# Patient Record
(demographics unavailable — no encounter records)

---

## 2025-03-20 NOTE — DATA REVIEWED
[MRI] : MRI [Left] : left [Knee] : knee [Report was reviewed and noted in the chart] : The report was reviewed and noted in the chart [I reviewed the films/CD] : I reviewed the films/CD [FreeTextEntry1] : Patellar chondropathy grade II/III.  Dysplastic femoral trochlea.  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  Oblique tear in the body and posterior horn of the medial meniscus associated with a parameniscal cyst.  Incipient semimembranosus tendinopathy.

## 2025-03-20 NOTE — HISTORY OF PRESENT ILLNESS
[de-identified] : Patient denies any injury, he reports loss of ROM that began about 1 year ago. He reports medial knee pain. He has treated with PT and HEP.

## 2025-03-20 NOTE — PHYSICAL EXAM
[Normal Coordination] : normal coordination [Normal Sensation] : normal sensation [Normal Mood and Affect] : normal mood and affect [Oriented] : oriented [Able to Communicate] : able to communicate [Well Developed] : well developed [Well Nourished] : well nourished [AP Standing] : anteroposterior standing [Outside films reviewed] : Outside films reviewed [Left] : left knee [4___] : hamstring 4[unfilled]/5 [Positive] : positive Gonzalo [] : no extensor lag [There are no fractures, subluxations or dislocations. No significant abnormalities are seen] : There are no fractures, subluxations or dislocations. No significant abnormalities are seen [[id number]] : flexion 120 degrees

## 2025-03-20 NOTE — DISCUSSION/SUMMARY
[Surgical risks reviewed] : Surgical risks reviewed [de-identified] : The risks of the procedure, including but not limited to infection, bleeding, anesthetic complication, neurovascular injury and the possibility of subsequent surgery were discussed; the benefits, non-operative alternatives and expectations of the proposed procedure were also discussed. Post-operative management, the procedure itself and the expected recovery period were discussed at length with the patient. The need for post-operative physical therapy and home exercise regimen, possible bracing and medication management were also discussed. Informed consent was obtained.